# Patient Record
Sex: FEMALE | Race: OTHER | Employment: UNEMPLOYED | URBAN - METROPOLITAN AREA
[De-identification: names, ages, dates, MRNs, and addresses within clinical notes are randomized per-mention and may not be internally consistent; named-entity substitution may affect disease eponyms.]

---

## 2018-12-08 ENCOUNTER — APPOINTMENT (OUTPATIENT)
Dept: GENERAL RADIOLOGY | Facility: HOSPITAL | Age: 38
End: 2018-12-08
Attending: EMERGENCY MEDICINE

## 2018-12-08 ENCOUNTER — HOSPITAL ENCOUNTER (EMERGENCY)
Facility: HOSPITAL | Age: 38
Discharge: HOME | End: 2018-12-08
Attending: EMERGENCY MEDICINE

## 2018-12-08 ENCOUNTER — APPOINTMENT (OUTPATIENT)
Dept: MRI IMAGING | Facility: HOSPITAL | Age: 38
End: 2018-12-08
Attending: EMERGENCY MEDICINE

## 2018-12-08 ENCOUNTER — APPOINTMENT (OUTPATIENT)
Dept: CT IMAGING | Facility: HOSPITAL | Age: 38
End: 2018-12-08
Attending: EMERGENCY MEDICINE

## 2018-12-08 VITALS
BODY MASS INDEX: 28.57 KG/M2 | DIASTOLIC BLOOD PRESSURE: 74 MMHG | OXYGEN SATURATION: 97 % | SYSTOLIC BLOOD PRESSURE: 110 MMHG | HEART RATE: 62 BPM | RESPIRATION RATE: 20 BRPM | HEIGHT: 60 IN | TEMPERATURE: 98 F | WEIGHT: 145.5 LBS

## 2018-12-08 DIAGNOSIS — K21.9 GASTROESOPHAGEAL REFLUX DISEASE, ESOPHAGITIS PRESENCE NOT SPECIFIED: Primary | ICD-10-CM

## 2018-12-08 PROCEDURE — 71045 X-RAY EXAM CHEST 1 VIEW: CPT | Performed by: EMERGENCY MEDICINE

## 2018-12-08 PROCEDURE — 83690 ASSAY OF LIPASE: CPT | Performed by: EMERGENCY MEDICINE

## 2018-12-08 PROCEDURE — 74177 CT ABD & PELVIS W/CONTRAST: CPT | Performed by: EMERGENCY MEDICINE

## 2018-12-08 PROCEDURE — 96360 HYDRATION IV INFUSION INIT: CPT

## 2018-12-08 PROCEDURE — 96361 HYDRATE IV INFUSION ADD-ON: CPT

## 2018-12-08 PROCEDURE — 93005 ELECTROCARDIOGRAM TRACING: CPT

## 2018-12-08 PROCEDURE — 80076 HEPATIC FUNCTION PANEL: CPT | Performed by: EMERGENCY MEDICINE

## 2018-12-08 PROCEDURE — 70551 MRI BRAIN STEM W/O DYE: CPT | Performed by: EMERGENCY MEDICINE

## 2018-12-08 PROCEDURE — 70450 CT HEAD/BRAIN W/O DYE: CPT | Performed by: EMERGENCY MEDICINE

## 2018-12-08 PROCEDURE — 81025 URINE PREGNANCY TEST: CPT

## 2018-12-08 PROCEDURE — 99285 EMERGENCY DEPT VISIT HI MDM: CPT

## 2018-12-08 PROCEDURE — 84484 ASSAY OF TROPONIN QUANT: CPT | Performed by: EMERGENCY MEDICINE

## 2018-12-08 PROCEDURE — 85025 COMPLETE CBC W/AUTO DIFF WBC: CPT | Performed by: EMERGENCY MEDICINE

## 2018-12-08 PROCEDURE — 93010 ELECTROCARDIOGRAM REPORT: CPT | Performed by: EMERGENCY MEDICINE

## 2018-12-08 PROCEDURE — 80048 BASIC METABOLIC PNL TOTAL CA: CPT | Performed by: EMERGENCY MEDICINE

## 2018-12-08 PROCEDURE — 81001 URINALYSIS AUTO W/SCOPE: CPT | Performed by: EMERGENCY MEDICINE

## 2018-12-08 RX ORDER — ACETAMINOPHEN 325 MG/1
650 TABLET ORAL ONCE
Status: COMPLETED | OUTPATIENT
Start: 2018-12-08 | End: 2018-12-08

## 2018-12-08 NOTE — ED NOTES
Pt to ER with multiple complaints. Pt states she has had a feeling of \" pressure\" in her mid abdomen since Thanksgiving. Pt states intermittent sob. Pt c/o nausea without vomiting and decreased appetite. Pt denies cough or fever. Pt denies diarrhea.  Pt s

## 2018-12-09 NOTE — ED PROVIDER NOTES
Pt endorsed to me pending CT/MRI results. Ct Brain Or Head ([id number])    Result Date: 12/8/2018  CONCLUSION:  1. No acute intracranial finding. 2. 8mm pineal region cyst. No hydrocephalus.  Otherwise normal.     Dictated by (CST): Jeanette Aviles MD on 12/08

## 2018-12-09 NOTE — ED NOTES
Pt c/o headache. MD notified. Pt states headache 5/10 to left forehead. Verbal order for tylenol 650 po once.

## 2018-12-12 NOTE — ED PROVIDER NOTES
Patient Seen in: [facility] Emergency Department    History   Patient presents with:  Bloating  Nausea/Vomiting/Diarrhea (gastrointestinal)    Stated Complaint: N&V w/Weakness and Bloating    HPI    Patient presents emergency department with multi Abdominal: Soft. Bowel sounds are normal. She exhibits no distension. There is generalized tenderness. There is no rigidity, no rebound and no guarding. Musculoskeletal: Normal range of motion. She exhibits no tenderness.    Neurological: She is alert a [id number]  [phone]              Medications Prescribed:  There are no discharge medications for this patient.

## 2022-08-28 ENCOUNTER — APPOINTMENT (OUTPATIENT)
Dept: GENERAL RADIOLOGY | Facility: HOSPITAL | Age: 42
End: 2022-08-28
Attending: NURSE PRACTITIONER

## 2022-08-28 ENCOUNTER — HOSPITAL ENCOUNTER (EMERGENCY)
Facility: HOSPITAL | Age: 42
Discharge: HOME | End: 2022-08-29

## 2022-08-28 DIAGNOSIS — Z77.098 CHEMICAL EXPOSURE: Primary | ICD-10-CM

## 2022-08-28 DIAGNOSIS — R11.11 VOMITING WITHOUT NAUSEA, UNSPECIFIED VOMITING TYPE: ICD-10-CM

## 2022-08-28 LAB
B-HCG UR QL: NEGATIVE
BASOPHILS # BLD AUTO: 0.04 X10(3) UL (ref 0–0.2)
BASOPHILS NFR BLD AUTO: 0.4 %
DEPRECATED RDW RBC AUTO: 40.6 FL (ref 35.1–46.3)
EOSINOPHIL # BLD AUTO: 0.28 X10(3) UL (ref 0–0.7)
EOSINOPHIL NFR BLD AUTO: 2.5 %
ERYTHROCYTE [DISTWIDTH] IN BLOOD BY AUTOMATED COUNT: 13 % (ref 11–15)
HCT VFR BLD AUTO: 43.5 %
HGB BLD-MCNC: 14.1 G/DL
IMM GRANULOCYTES # BLD AUTO: 0.03 X10(3) UL (ref 0–1)
IMM GRANULOCYTES NFR BLD: 0.3 %
LYMPHOCYTES # BLD AUTO: 4.29 X10(3) UL (ref 1–4)
LYMPHOCYTES NFR BLD AUTO: 38.4 %
MCH RBC QN AUTO: 27.9 PG (ref 26–34)
MCHC RBC AUTO-ENTMCNC: 32.4 G/DL (ref 31–37)
MCV RBC AUTO: 86.1 FL
MONOCYTES # BLD AUTO: 0.71 X10(3) UL (ref 0.1–1)
MONOCYTES NFR BLD AUTO: 6.4 %
NEUTROPHILS # BLD AUTO: 5.81 X10 (3) UL (ref 1.5–7.7)
NEUTROPHILS # BLD AUTO: 5.81 X10(3) UL (ref 1.5–7.7)
NEUTROPHILS NFR BLD AUTO: 52 %
PLATELET # BLD AUTO: 280 10(3)UL (ref 150–450)
RBC # BLD AUTO: 5.05 X10(6)UL
WBC # BLD AUTO: 11.2 X10(3) UL (ref 4–11)

## 2022-08-28 PROCEDURE — 71045 X-RAY EXAM CHEST 1 VIEW: CPT | Performed by: NURSE PRACTITIONER

## 2022-08-28 PROCEDURE — 81025 URINE PREGNANCY TEST: CPT

## 2022-08-28 PROCEDURE — 99284 EMERGENCY DEPT VISIT MOD MDM: CPT

## 2022-08-28 PROCEDURE — 93010 ELECTROCARDIOGRAM REPORT: CPT | Performed by: NURSE PRACTITIONER

## 2022-08-28 PROCEDURE — 36415 COLL VENOUS BLD VENIPUNCTURE: CPT

## 2022-08-28 PROCEDURE — 84484 ASSAY OF TROPONIN QUANT: CPT | Performed by: NURSE PRACTITIONER

## 2022-08-28 PROCEDURE — 80048 BASIC METABOLIC PNL TOTAL CA: CPT | Performed by: NURSE PRACTITIONER

## 2022-08-28 PROCEDURE — 85025 COMPLETE CBC W/AUTO DIFF WBC: CPT | Performed by: NURSE PRACTITIONER

## 2022-08-28 PROCEDURE — 93005 ELECTROCARDIOGRAM TRACING: CPT

## 2022-08-29 VITALS
TEMPERATURE: 98 F | RESPIRATION RATE: 16 BRPM | BODY MASS INDEX: 29 KG/M2 | OXYGEN SATURATION: 98 % | WEIGHT: 150 LBS | HEART RATE: 67 BPM | SYSTOLIC BLOOD PRESSURE: 157 MMHG | DIASTOLIC BLOOD PRESSURE: 87 MMHG

## 2022-08-29 LAB
ANION GAP SERPL CALC-SCNC: 9 MMOL/L (ref 0–18)
BUN BLD-MCNC: 14 MG/DL (ref 7–18)
BUN/CREAT SERPL: 17.9 (ref 10–20)
CALCIUM BLD-MCNC: 9 MG/DL (ref 8.5–10.1)
CHLORIDE SERPL-SCNC: 106 MMOL/L (ref 98–112)
CO2 SERPL-SCNC: 23 MMOL/L (ref 21–32)
CREAT BLD-MCNC: 0.78 MG/DL
GFR SERPLBLD BASED ON 1.73 SQ M-ARVRAT: 99 ML/MIN/1.73M2 (ref 60–?)
GLUCOSE BLD-MCNC: 107 MG/DL (ref 70–99)
OSMOLALITY SERPL CALC.SUM OF ELEC: 287 MOSM/KG (ref 275–295)
POTASSIUM SERPL-SCNC: 3.8 MMOL/L (ref 3.5–5.1)
SODIUM SERPL-SCNC: 138 MMOL/L (ref 136–145)
TROPONIN I HIGH SENSITIVITY: 3 NG/L

## 2022-08-29 NOTE — ED INITIAL ASSESSMENT (HPI)
Patient presents with:  Ingestion: AOx4. COmplaints of possible ingestion. Pt states she was at grocery store and shook bag, white powder flew onto face and she ingested some of it. Reporting tightness to chest, emesis xs 2, bitter taste to mouth. Reports feeling drowsy.

## 2022-08-29 NOTE — ED QUICK NOTES
Pt a/ox4, respirations unlabored, speech full/clear, gait steady, no acute distress. All ED orders completed. Pt instructed to return to ED for any new/severe s/s or as directed by provider, and to see PMD/specialist ASAP or as directed by provider. IV to R AC removed.   Pt reports improvement to s/s following GI cocktail

## 2024-01-01 ENCOUNTER — HOSPITAL ENCOUNTER (EMERGENCY)
Facility: HOSPITAL | Age: 44
Discharge: HOME | End: 2024-01-02
Attending: EMERGENCY MEDICINE

## 2024-01-01 ENCOUNTER — APPOINTMENT (OUTPATIENT)
Dept: ULTRASOUND IMAGING | Facility: HOSPITAL | Age: 44
End: 2024-01-01
Attending: EMERGENCY MEDICINE

## 2024-01-01 DIAGNOSIS — R10.9 ABDOMINAL PAIN, ACUTE: ICD-10-CM

## 2024-01-01 DIAGNOSIS — K76.0 FATTY LIVER: Primary | ICD-10-CM

## 2024-01-01 LAB
ALBUMIN SERPL-MCNC: 5 G/DL (ref 3.2–4.8)
ALBUMIN/GLOB SERPL: 1.6 {RATIO} (ref 1–2)
ALP LIVER SERPL-CCNC: 65 U/L
ALT SERPL-CCNC: 33 U/L
ANION GAP SERPL CALC-SCNC: 7 MMOL/L (ref 0–18)
AST SERPL-CCNC: 21 U/L (ref ?–34)
BASOPHILS # BLD AUTO: 0.04 X10(3) UL (ref 0–0.2)
BASOPHILS NFR BLD AUTO: 0.3 %
BILIRUB SERPL-MCNC: 0.4 MG/DL (ref 0.3–1.2)
BUN BLD-MCNC: 12 MG/DL (ref 9–23)
BUN/CREAT SERPL: 12.8 (ref 10–20)
CALCIUM BLD-MCNC: 10.1 MG/DL (ref 8.7–10.4)
CHLORIDE SERPL-SCNC: 107 MMOL/L (ref 98–112)
CO2 SERPL-SCNC: 26 MMOL/L (ref 21–32)
CREAT BLD-MCNC: 0.94 MG/DL
DEPRECATED RDW RBC AUTO: 41.1 FL (ref 35.1–46.3)
EGFRCR SERPLBLD CKD-EPI 2021: 78 ML/MIN/1.73M2 (ref 60–?)
EOSINOPHIL # BLD AUTO: 0.38 X10(3) UL (ref 0–0.7)
EOSINOPHIL NFR BLD AUTO: 3.1 %
ERYTHROCYTE [DISTWIDTH] IN BLOOD BY AUTOMATED COUNT: 13.2 % (ref 11–15)
GLOBULIN PLAS-MCNC: 3.2 G/DL (ref 2.8–4.4)
GLUCOSE BLD-MCNC: 101 MG/DL (ref 70–99)
HCT VFR BLD AUTO: 44.2 %
HGB BLD-MCNC: 14.5 G/DL
IMM GRANULOCYTES # BLD AUTO: 0.04 X10(3) UL (ref 0–1)
IMM GRANULOCYTES NFR BLD: 0.3 %
LIPASE SERPL-CCNC: 42 U/L (ref 13–75)
LYMPHOCYTES # BLD AUTO: 4.57 X10(3) UL (ref 1–4)
LYMPHOCYTES NFR BLD AUTO: 36.8 %
MCH RBC QN AUTO: 27.9 PG (ref 26–34)
MCHC RBC AUTO-ENTMCNC: 32.8 G/DL (ref 31–37)
MCV RBC AUTO: 85 FL
MONOCYTES # BLD AUTO: 0.75 X10(3) UL (ref 0.1–1)
MONOCYTES NFR BLD AUTO: 6 %
NEUTROPHILS # BLD AUTO: 6.65 X10 (3) UL (ref 1.5–7.7)
NEUTROPHILS # BLD AUTO: 6.65 X10(3) UL (ref 1.5–7.7)
NEUTROPHILS NFR BLD AUTO: 53.5 %
OSMOLALITY SERPL CALC.SUM OF ELEC: 290 MOSM/KG (ref 275–295)
PLATELET # BLD AUTO: 293 10(3)UL (ref 150–450)
POTASSIUM SERPL-SCNC: 4.2 MMOL/L (ref 3.5–5.1)
PROT SERPL-MCNC: 8.2 G/DL (ref 5.7–8.2)
RBC # BLD AUTO: 5.2 X10(6)UL
SODIUM SERPL-SCNC: 140 MMOL/L (ref 136–145)
WBC # BLD AUTO: 12.4 X10(3) UL (ref 4–11)

## 2024-01-01 PROCEDURE — 80053 COMPREHEN METABOLIC PANEL: CPT

## 2024-01-01 PROCEDURE — 76705 ECHO EXAM OF ABDOMEN: CPT | Performed by: EMERGENCY MEDICINE

## 2024-01-01 PROCEDURE — 85025 COMPLETE CBC W/AUTO DIFF WBC: CPT | Performed by: EMERGENCY MEDICINE

## 2024-01-01 PROCEDURE — 83690 ASSAY OF LIPASE: CPT | Performed by: EMERGENCY MEDICINE

## 2024-01-01 PROCEDURE — 99284 EMERGENCY DEPT VISIT MOD MDM: CPT

## 2024-01-01 PROCEDURE — 85025 COMPLETE CBC W/AUTO DIFF WBC: CPT

## 2024-01-01 PROCEDURE — 80053 COMPREHEN METABOLIC PANEL: CPT | Performed by: EMERGENCY MEDICINE

## 2024-01-01 PROCEDURE — 83690 ASSAY OF LIPASE: CPT

## 2024-01-01 PROCEDURE — 96374 THER/PROPH/DIAG INJ IV PUSH: CPT

## 2024-01-02 ENCOUNTER — APPOINTMENT (OUTPATIENT)
Dept: CT IMAGING | Facility: HOSPITAL | Age: 44
End: 2024-01-02
Attending: EMERGENCY MEDICINE

## 2024-01-02 VITALS
DIASTOLIC BLOOD PRESSURE: 71 MMHG | RESPIRATION RATE: 18 BRPM | OXYGEN SATURATION: 99 % | TEMPERATURE: 98 F | HEART RATE: 78 BPM | SYSTOLIC BLOOD PRESSURE: 116 MMHG

## 2024-01-02 LAB
B-HCG UR QL: NEGATIVE
B-HCG UR QL: NEGATIVE

## 2024-01-02 PROCEDURE — 81025 URINE PREGNANCY TEST: CPT

## 2024-01-02 PROCEDURE — 74177 CT ABD & PELVIS W/CONTRAST: CPT | Performed by: EMERGENCY MEDICINE

## 2024-01-02 PROCEDURE — 71260 CT THORAX DX C+: CPT | Performed by: EMERGENCY MEDICINE

## 2024-01-02 RX ORDER — KETOROLAC TROMETHAMINE 15 MG/ML
15 INJECTION, SOLUTION INTRAMUSCULAR; INTRAVENOUS ONCE
Status: COMPLETED | OUTPATIENT
Start: 2024-01-02 | End: 2024-01-02

## 2024-01-02 NOTE — ED INITIAL ASSESSMENT (HPI)
Pt to ED for right upper abdominal pain, radiating to back, also with nausea, for the past month but getting worse. Pt saw  on 12/4, suspected she has a gallbladder issue.

## 2024-01-02 NOTE — ED PROVIDER NOTES
Patient Seen in: [facility] Emergency Department      History     Chief Complaint   Patient presents with    Abdomen/Flank Pain    Back Pain    Nausea/Vomiting/Diarrhea     Stated Complaint: R back and SOB    Subjective:   HPI    Patient is a 43-year-old female who presents with 2 weeks of upper abdominal pain that is worse with eating.  She states the pain is minimal today because she did not eat much but she did have 2 tacos earlier today.  Positive nausea and pain more localized to the right side where she feels like it is inflamed.  No fevers or diarrhea.    Objective:   Past Medical History:   Diagnosis Date    Migraines               Past Surgical History:   Procedure Laterality Date    APPENDECTOMY                  Social History     Socioeconomic History    Marital status:    Tobacco Use    Smoking status: Never    Smokeless tobacco: Never   Substance and Sexual Activity    Alcohol use: Not Currently    Drug use: Not Currently              Review of Systems    Positive for stated complaint: R back and SOB  Other systems are as noted in HPI.  Constitutional and vital signs reviewed.      All other systems reviewed and negative except as noted above.    Physical Exam     ED Triage Vitals   BP 01/01/24 1942 134/72   Pulse 01/01/24 1942 74   Resp 01/01/24 1941 18   Temp 01/01/24 1941 97.9 °F (36.6 °C)   Temp src 01/01/24 1941 Temporal   SpO2 01/01/24 1942 100 %   O2 Device 01/01/24 1942 None (Room air)       Current:/71   Pulse 64   Temp 97.9 °F (36.6 °C) (Temporal)   Resp 18   LMP 12/22/2023 (Exact Date)   SpO2 96%         Physical Exam    GENERAL: No acute distress, awake and alert  HEENT: EOMI, PERRL  Neck: supple  CV: RRR, no murmurs  Resp: CTAB, no wheezes or retractions  Ab: soft, TTP in RUQ, epigastrum. No guarding/rebound or masses, murphys negative  Extremities: FROM of all extremities  Neuro: CN intact, normal speech, normal gait, 5/5 motor strength in all extremities, no  focal deficits  SKIN: warm, dry, no rashes      ED Course     Labs Reviewed   COMP METABOLIC PANEL (14) - Abnormal; Notable for the following components:       Result Value    Glucose 101 (*)     Albumin 5.0 (*)     All other components within normal limits   CBC W/ DIFFERENTIAL - Abnormal; Notable for the following components:    WBC 12.4 (*)     Lymphocyte Absolute 4.57 (*)     All other components within normal limits   LIPASE - Normal   POCT PREGNANCY URINE - Normal   POCT PREGNANCY URINE - Normal   CBC WITH DIFFERENTIAL WITH PLATELET    Narrative:     The following orders were created for panel order CBC With Differential With Platelet.  Procedure                               Abnormality         Status                     ---------                               -----------         ------                     CBC W/ DIFFERENTIAL[[id number]]          Abnormal            Final result                 Please view results for these tests on the individual orders.     MDM          Medical Decision Making  Ddx: biliary colic, cholecystitis, pancreatitis, gastritis    Pt states pain radiates into chest/upper back but mostly right sided. CT ordered to r/o PE and other pathology    Pt notified of CT results. Improved with toradol. D/w patient findings and recommend PCP f/u. Advised on return precautions    Amount and/or Complexity of Data Reviewed  Labs: ordered.  Radiology: ordered.     Details:   IMPRESSION:  No acute findings.    Gallbladder appears normal.  No gallstones or appreciable sludge within the gallbladder  No gallbladder wall thickening or pericholecystic fluid  Negative sonographic Obando's sign per report  No ductal dilation    Liver demonstrates diffuse increased hepatic echogenicity compatible with steatosis  Focal fatty sparing in the gallbladder fossa  Additional 1 cm rounded hypoechoic focus in the left lobe of liver which may reflect a nodular focus of fatty sparing  Underlying lesion would be  difficult to exclude  Outpatient MRI could be performed for more definitive characterization        CTA chest PE  CTA abdomen and pelvis      IMPRESSION:  CTA chest:  No acute findings    Technically good contrast opacification of the pulmonary arteries  No evidence for pulmonary embolus  No acute aortic pathology     Normal size heart  Pericardium appears within normal limits    No evidence for pneumonia or pulmonary edema.  Mild bilateral dependent atelectasis  No pleural effusion or pneumothorax    CT abdomen and pelvis:  No acute intra-abdominal pathology    Colonic diverticulosis without evidence for diverticulitis  Appendectomy  1.8 cm probable involuting corpus luteum left ovary  No appreciable free pelvic fluid  Hepatic steatosis        Visualized pancreas appears normal    Right kidney appears normal    No appreciable free fluid      Risk  OTC drugs.        Disposition and Plan     Clinical Impression:  1. Fatty liver    2. Abdominal pain, acute         Disposition:  Discharge  1/2/2024  1:34 am    Follow-up:  Gareth Last MD  [address] [place] [address] [id number] [phone]    Follow up            Medications Prescribed:  There are no discharge medications for this patient.

## 2024-09-27 ENCOUNTER — APPOINTMENT (OUTPATIENT)
Dept: GENERAL RADIOLOGY | Facility: HOSPITAL | Age: 44
End: 2024-09-27
Attending: EMERGENCY MEDICINE

## 2024-09-27 ENCOUNTER — HOSPITAL ENCOUNTER (EMERGENCY)
Facility: HOSPITAL | Age: 44
Discharge: HOME | End: 2024-09-27
Attending: EMERGENCY MEDICINE

## 2024-09-27 ENCOUNTER — APPOINTMENT (OUTPATIENT)
Dept: MRI IMAGING | Facility: HOSPITAL | Age: 44
End: 2024-09-27
Attending: EMERGENCY MEDICINE

## 2024-09-27 ENCOUNTER — APPOINTMENT (OUTPATIENT)
Dept: CT IMAGING | Facility: HOSPITAL | Age: 44
End: 2024-09-27
Attending: EMERGENCY MEDICINE

## 2024-09-27 VITALS
RESPIRATION RATE: 17 BRPM | OXYGEN SATURATION: 96 % | BODY MASS INDEX: 29.84 KG/M2 | HEART RATE: 72 BPM | WEIGHT: 152 LBS | DIASTOLIC BLOOD PRESSURE: 75 MMHG | TEMPERATURE: 98 F | HEIGHT: 60 IN | SYSTOLIC BLOOD PRESSURE: 113 MMHG

## 2024-09-27 DIAGNOSIS — G24.5 EYE TWITCH: ICD-10-CM

## 2024-09-27 DIAGNOSIS — R20.2 FACIAL PARESTHESIA: Primary | ICD-10-CM

## 2024-09-27 DIAGNOSIS — R07.89 CHEST PAIN, ATYPICAL: ICD-10-CM

## 2024-09-27 LAB
ANION GAP SERPL CALC-SCNC: 7 MMOL/L (ref 0–18)
B-HCG UR QL: NEGATIVE
BASOPHILS # BLD AUTO: 0.05 X10(3) UL (ref 0–0.2)
BASOPHILS NFR BLD AUTO: 0.4 %
BUN BLD-MCNC: 11 MG/DL (ref 9–23)
BUN/CREAT SERPL: 13.1 (ref 10–20)
CALCIUM BLD-MCNC: 9.5 MG/DL (ref 8.7–10.4)
CHLORIDE SERPL-SCNC: 110 MMOL/L (ref 98–112)
CO2 SERPL-SCNC: 24 MMOL/L (ref 21–32)
CREAT BLD-MCNC: 0.84 MG/DL
DEPRECATED RDW RBC AUTO: 40 FL (ref 35.1–46.3)
EGFRCR SERPLBLD CKD-EPI 2021: 89 ML/MIN/1.73M2 (ref 60–?)
EOSINOPHIL # BLD AUTO: 0.25 X10(3) UL (ref 0–0.7)
EOSINOPHIL NFR BLD AUTO: 2.1 %
ERYTHROCYTE [DISTWIDTH] IN BLOOD BY AUTOMATED COUNT: 13 % (ref 11–15)
GLUCOSE BLD-MCNC: 110 MG/DL (ref 70–99)
HCT VFR BLD AUTO: 41.9 %
HGB BLD-MCNC: 13.9 G/DL
IMM GRANULOCYTES # BLD AUTO: 0.03 X10(3) UL (ref 0–1)
IMM GRANULOCYTES NFR BLD: 0.2 %
LYMPHOCYTES # BLD AUTO: 3.72 X10(3) UL (ref 1–4)
LYMPHOCYTES NFR BLD AUTO: 30.7 %
MCH RBC QN AUTO: 28.1 PG (ref 26–34)
MCHC RBC AUTO-ENTMCNC: 33.2 G/DL (ref 31–37)
MCV RBC AUTO: 84.6 FL
MONOCYTES # BLD AUTO: 0.73 X10(3) UL (ref 0.1–1)
MONOCYTES NFR BLD AUTO: 6 %
NEUTROPHILS # BLD AUTO: 7.34 X10 (3) UL (ref 1.5–7.7)
NEUTROPHILS # BLD AUTO: 7.34 X10(3) UL (ref 1.5–7.7)
NEUTROPHILS NFR BLD AUTO: 60.6 %
OSMOLALITY SERPL CALC.SUM OF ELEC: 292 MOSM/KG (ref 275–295)
PLATELET # BLD AUTO: 274 10(3)UL (ref 150–450)
POTASSIUM SERPL-SCNC: 3.9 MMOL/L (ref 3.5–5.1)
RBC # BLD AUTO: 4.95 X10(6)UL
SODIUM SERPL-SCNC: 141 MMOL/L (ref 136–145)
TROPONIN I SERPL HS-MCNC: <3 NG/L
WBC # BLD AUTO: 12.1 X10(3) UL (ref 4–11)

## 2024-09-27 PROCEDURE — 70551 MRI BRAIN STEM W/O DYE: CPT | Performed by: EMERGENCY MEDICINE

## 2024-09-27 PROCEDURE — 93010 ELECTROCARDIOGRAM REPORT: CPT

## 2024-09-27 PROCEDURE — 99285 EMERGENCY DEPT VISIT HI MDM: CPT

## 2024-09-27 PROCEDURE — 70498 CT ANGIOGRAPHY NECK: CPT | Performed by: EMERGENCY MEDICINE

## 2024-09-27 PROCEDURE — 36415 COLL VENOUS BLD VENIPUNCTURE: CPT

## 2024-09-27 PROCEDURE — 99284 EMERGENCY DEPT VISIT MOD MDM: CPT

## 2024-09-27 PROCEDURE — 85025 COMPLETE CBC W/AUTO DIFF WBC: CPT | Performed by: EMERGENCY MEDICINE

## 2024-09-27 PROCEDURE — 80048 BASIC METABOLIC PNL TOTAL CA: CPT | Performed by: EMERGENCY MEDICINE

## 2024-09-27 PROCEDURE — 71045 X-RAY EXAM CHEST 1 VIEW: CPT | Performed by: EMERGENCY MEDICINE

## 2024-09-27 PROCEDURE — 84484 ASSAY OF TROPONIN QUANT: CPT | Performed by: EMERGENCY MEDICINE

## 2024-09-27 PROCEDURE — 81025 URINE PREGNANCY TEST: CPT

## 2024-09-27 PROCEDURE — 93005 ELECTROCARDIOGRAM TRACING: CPT

## 2024-09-27 RX ORDER — KETOROLAC TROMETHAMINE 15 MG/ML
15 INJECTION, SOLUTION INTRAMUSCULAR; INTRAVENOUS ONCE
Status: DISCONTINUED | OUTPATIENT
Start: 2024-09-27 | End: 2024-09-27

## 2024-09-27 NOTE — ED PROVIDER NOTES
Patient Seen in: [facility] Emergency Department    History     Chief Complaint   Patient presents with    Tingling     Stated Complaint: L side face tingling     HPI    44 yo F with PMH GERD, migraines presenting with sister for evaluation of left eye lid twitching for past several days now with one day of multiple associated complaints including: worsening twitching with facial and chest pain described as \"feeling hot\" in addition to left arm tingling with left neck pain. No midline neck/back pain. No exertional/pleuritic complaints.  No vision loss, no focal weakness/paresthesias. No preceding traumatic/infectious complaint. No vomiting/diarrhea. No recent travel/surgeries, no LE swelling/tenderness, no hemoptysis or exogenous estrogen use.    Past Medical History:    Esophageal reflux    Migraines       Past Surgical History:   Procedure Laterality Date    Appendectomy              No family history on file.    Social History     Socioeconomic History    Marital status:    Tobacco Use    Smoking status: Never    Smokeless tobacco: Never   Vaping Use    Vaping status: Never Used   Substance and Sexual Activity    Alcohol use: Not Currently    Drug use: Not Currently       Review of Systems :  Constitutional: As per HPI  Respiratory: Negative for cough and shortness of breath.    Cardiovascular: (+) chest pain.   Neurological: Negative for syncope and headaches.     Positive for stated complaint: L side face tingling  Other systems are as noted in HPI.  Constitutional and vital signs reviewed.      All other systems reviewed and negative except as noted above.    PSFH elements reviewed from today and agreed except as otherwise stated in HPI.    Physical Exam     ED Triage Vitals [09/27/24 1718]   /79   Pulse 77   Resp 19   Temp 98.2 °F (36.8 °C)   Temp src Oral   SpO2 99 %   O2 Device None (Room air)       Current:/79   Pulse 77   Temp 98.2 °F (36.8 °C) (Oral)   Resp 19   Ht 152.4 cm  (5')   Wt 68.9 kg   LMP 08/16/2024 (Approximate)   SpO2 99%   BMI 29.69 kg/m²         Physical Exam   Constitutional: No distress. Nontoxic, well-appearing.  HEENT: MMM.  Head: Normocephalic. Atraumatic. No temporal tenderness.  Eyes: No injection. Pupils midrange and equally reactive. Full/painless EOM. No proptosis/hyphema. No photophobia.  Neck: Neck supple. No meningismus.   Cardiovascular: RRR. BUE with 2+ radial pulses  Pulmonary/Chest: Effort normal. CTAB.  Abdominal: Soft. Nontender.  Musculoskeletal: No gross deformity.  Neurological: Alert. CN II-XII grossly intact including equal seventh nerve motor function and intact/equal V1-V3 sensation. BUE/BLE proximally and distally with 5/5 strength including diffusely intact/equal BUE with SILT.  Skin: Skin is warm.   Psychiatric: Cooperative.  Nursing note and vitals reviewed.      ED Course     Labs Reviewed   CBC WITH DIFFERENTIAL WITH PLATELET - Abnormal; Notable for the following components:       Result Value    WBC 12.1 (*)     All other components within normal limits   BASIC METABOLIC PANEL (8) - Abnormal; Notable for the following components:    Glucose 110 (*)     All other components within normal limits   TROPONIN I HIGH SENSITIVITY - Normal   POCT PREGNANCY URINE - Normal     EKG    Rate, intervals and axes as noted on EKG Report.  Rate: 71  Rhythm: Sinus Rhythm  Reading: NSR 71 without TITA as independently interpreted by myself           MRI BRAIN WO ACUTE (3) SEQUENCE (CPT=70551)    Result Date: 9/27/2024  PROCEDURE: MRI BRAIN WO ACUTE (3) SEQUENCE(CPT=70551)  COMPARISON: [facility], MRI BRAIN (CPT=70551), 12/08/2018, 6:50 PM.  INDICATIONS: Acute paresthesias.  L side face tingling  TECHNIQUE: Fast brain stroke protocol MRI.  A variety of imaging planes and parameters were utilized for visualization of suspected pathology.  Images were performed without contrast.  FINDINGS:  CEREBRUM: No edema, hemorrhage, mass or acute  infarct. CEREBELLUM: No edema, hemorrhage, mass or acute infarct. BRAINSTEM: No edema, hemorrhage, mass or acute infarct. CSF SPACES: No hydrocephalus, subarachnoid hemorrhage, or mass.  SKULL: No mass or other significant visible lesion.  SINUSES: Left maxillary sinus retention cyst. ORBITS: Limited views are unremarkable.  OTHER: No restricted diffusion. Flow is present in the anterior and posterior circulation vessels.         CONCLUSION:  1. No acute infarct or hemorrhage.    Dictated by (CST): Eric Slaughter MD on 9/27/2024 at 7:36 PM     Finalized by (CST): Eric Slaughter MD on 9/27/2024 at 7:38 PM          CTA CAROTID ARTERIES (CPT=70498)    Result Date: 9/27/2024  PROCEDURE: CTA CAROTID ARTERIES (CPT=70498)  COMPARISON: None.  INDICATIONS: Acute paresthesias.  Left side face tingling.  TECHNIQUE:   CT images of the neck were obtained with non-ionic intravenous contrast material. Multi-planar reformatted/3-D images were created to optimize visualization of vascular anatomy.  Automated exposure control for dose reduction was used. Adjustment of the mA and/or kV was done based on the patient's size. Use of iterative reconstruction technique for dose reduction was used. Dose information is transmitted to the ACR (American College of Radiology) NRDR (National Radiology Data Registry)  which includes the Dose Index Registry. Evaluation of internal carotid artery stenosis is based on NASCET Criteria.   FINDINGS: CAROTID ARTERIES: RIGHT COMMON CAROTID:  No hemodynamically significant stenosis or dissection.  RIGHT INTERNAL CAROTID: No hemodynamically significant stenosis or dissection.   LEFT COMMON CAROTID: No hemodynamically significant stenosis or dissection.  LEFT INTERNAL CAROTID: No hemodynamically significant stenosis or dissection.    VERTEBRAL ARTERIES: RIGHT:  No hemodynamically significant stenosis or dissection.  LEFT: No hemodynamically significant stenosis or dissection.   BASILAR ARTERY: No  hemodynamically significant stenosis or dissection.   SUBCLAVIAN ARTERIES: RIGHT:    No hemodynamically significant stenosis or dissection.  LEFT:   No hemodynamically significant stenosis or dissection.  INNOMINATE ARTERY:   No hemodynamically significant stenosis or dissection.     AORTIC ARCH (Limited):   Normal.  No aneurysm or dissection.  MEDIASTINUM/APICES (Limited):   No mass or adenopathy.      OTHER: No suspicious neck mass or lymphadenopathy.  A left maxillary sinus retention cyst.  No suspicious bone lesion or fracture.         CONCLUSION:  1. Negative CTA.    Dictated by (CST): Eric Slaughter MD on 9/27/2024 at 7:15 PM     Finalized by (CST): Eric Slaughter MD on 9/27/2024 at 7:19 PM          XR CHEST AP PORTABLE  (CPT=71045)    Result Date: 9/27/2024  PROCEDURE: XR CHEST AP PORTABLE  (CPT=71045) TIME: 1840 hours  COMPARISON: [facility], XR CHEST AP PORTABLE (CPT=71045), 8/28/2022, 11:26 PM.  INDICATIONS: History of esophageal reflux.  Left-sided paresthesias.  Left side face tingling x3 days.  TECHNIQUE:   Single view.   FINDINGS:  CARDIAC/VASC: Heart size and pulmonary vascularity normal.  MEDIAST/CHINO:   No visible mass or adenopathy. LUNGS/PLEURA: No consolidation or pleural effusion. BONES: No fracture or visible bony lesion. OTHER: Negative.          CONCLUSION: Normal examination.     Dictated by (CST): Eric Slaughter MD on 9/27/2024 at 7:03 PM     Finalized by (CST): Eric Slaughter MD on 9/27/2024 at 7:04 PM           ED Course as of 09/28/24 1116  ------------------------------------------------------------  Time: 09/27 2029  Comment: Resting comfortably with nonacute ED course.       MDM   DIFFERENTIAL DIAGNOSIS: After history and physical exam differential diagnosis includes but is not limited to cervical artery dissection, CVA/demyelinating disease, ACS/myocarditis, electrolyte derangement, VTE.    Pulse ox: 99%:Normal on RA, as independently interpreted by myself    Cardiac  Monitor Interpretation:   Pulse Readings from Last 1 Encounters:   09/27/24 77   , sinus,      Medical Decision Making  Evaluation for several days of left eye twitching now with multiple superimposed complaints in neurovascularly intact patient - labs/imaging nonacute in low risk HEART/PERC negative patient with nonacute ED course; stable for discharge with symptomatic care and ongoing outpatient followup.    Problems Addressed:  Chest pain, atypical: acute illness or injury  Eye twitch: acute illness or injury  Facial paresthesia: acute illness or injury    Amount and/or Complexity of Data Reviewed  Labs: ordered. Decision-making details documented in ED Course.  Radiology: ordered and independent interpretation performed. Decision-making details documented in ED Course.     Details: CXR without obvious pneumothorax as independently interpreted by myself    ECG/medicine tests: ordered and independent interpretation performed. Decision-making details documented in ED Course.    Risk  Prescription drug management.      I was wearing at minimum a facemask and eye protection throughout this encounter with handwashing performed prior and after patient evaluation without personal hand/facial/oropharyngeal contact and gloves worn throughout encounter. See note and/or contact this provider for further PPE details.      Disposition and Plan     Clinical Impression:  1. Facial paresthesia    2. Eye twitch    3. Chest pain, atypical        Disposition:  Discharge    Follow-up:  Your PCP    Call  For followup and re-evaluation.      Medications Prescribed:  There are no discharge medications for this patient.

## 2024-09-27 NOTE — ED INITIAL ASSESSMENT (HPI)
Pt presents for c/o twitching under left eye intermittent for the last three days. Pt reports today she experienced redness and flushing to left side of face since 1400, along with tingling to left forehead. Pt also reports tingling to left arm and hand. BEFAST negative.

## 2024-09-29 LAB
ATRIAL RATE: 71 BPM
ATRIAL RATE: 76 BPM
P AXIS: 19 DEGREES
P AXIS: 26 DEGREES
P-R INTERVAL: 140 MS
P-R INTERVAL: 142 MS
Q-T INTERVAL: 378 MS
Q-T INTERVAL: 386 MS
QRS DURATION: 76 MS
QRS DURATION: 76 MS
QTC CALCULATION (BEZET): 419 MS
QTC CALCULATION (BEZET): 425 MS
R AXIS: 49 DEGREES
R AXIS: 50 DEGREES
T AXIS: 27 DEGREES
T AXIS: 33 DEGREES
VENTRICULAR RATE: 71 BPM
VENTRICULAR RATE: 76 BPM

## (undated) NOTE — ED AVS SNAPSHOT
[facility]   MRN: [id number]    Department:  [facility] Emergency Department   Date of Visit:  12/8/2018           Disclosure     Insurance plans vary and the physician(s) referred by the ER may not be covered by your plan.  Please c CARE PHYSICIAN AT ONCE OR RETURN IMMEDIATELY TO THE EMERGENCY DEPARTMENT. If you have been prescribed any medication(s), please fill your prescription right away and begin taking the medication(s) as directed.   If you believe that any of the medications